# Patient Record
(demographics unavailable — no encounter records)

---

## 2024-12-05 NOTE — PLAN
[TextEntry] : Going for requested lab tests now.  Also, advised to avoid physical activity and any activity with risk of abdominal trauma until further notice.

## 2024-12-05 NOTE — HISTORY OF PRESENT ILLNESS
[de-identified] : Sore throat [FreeTextEntry6] : 1 week sore throat, nasal congestion and occasional mild headache

## 2025-02-03 NOTE — PLAN
[TextEntry] : After obtaining patient's permission discussed simultaneously with mother and patient.  Advised him  to go immediately to Emergency Department for evaluation, including CT scan.  He will go to [facility] or [facility] on  or [facility] ED now..

## 2025-02-03 NOTE — HISTORY OF PRESENT ILLNESS
[de-identified] : Headaches [FreeTextEntry6] : 5 days of occasional mild headaches lasting approximately 30 minutes and either spontaneously subsiding or subsiding with Ibuprofen.  Also, few seconds of feeling like his eyes are "strained".

## 2025-02-03 NOTE — PHYSICAL EXAM
[NL] : warm, clear [de-identified] : Ingrown right first toe nail; otherwise normal [de-identified] : Normal; no deficits;  Fundi normal to limited exam